# Patient Record
Sex: FEMALE | Race: OTHER | HISPANIC OR LATINO | Employment: OTHER | URBAN - METROPOLITAN AREA
[De-identification: names, ages, dates, MRNs, and addresses within clinical notes are randomized per-mention and may not be internally consistent; named-entity substitution may affect disease eponyms.]

---

## 2023-09-08 ENCOUNTER — TELEPHONE (OUTPATIENT)
Dept: PRIMARY CARE | Facility: CLINIC | Age: 73
End: 2023-09-08
Payer: MEDICARE

## 2023-09-08 NOTE — TELEPHONE ENCOUNTER
Potential patient called in and stated that you see her daughter Fela Park. The patient was last seen in our office on 08/07/2015. The patient would like to know can she reestablish with you?

## 2023-12-12 PROBLEM — Z00.00 MEDICARE ANNUAL WELLNESS VISIT, INITIAL: Status: ACTIVE | Noted: 2023-12-12

## 2024-01-06 ENCOUNTER — LAB (OUTPATIENT)
Dept: LAB | Facility: LAB | Age: 74
End: 2024-01-06
Payer: MEDICARE

## 2024-01-06 DIAGNOSIS — Z00.00 ANNUAL PHYSICAL EXAM: ICD-10-CM

## 2024-01-06 DIAGNOSIS — Z13.29 SCREENING FOR THYROID DISORDER: ICD-10-CM

## 2024-01-06 DIAGNOSIS — Z11.59 ENCOUNTER FOR HEPATITIS C SCREENING TEST FOR LOW RISK PATIENT: ICD-10-CM

## 2024-01-06 DIAGNOSIS — Z13.220 LIPID SCREENING: ICD-10-CM

## 2024-01-06 LAB
ALBUMIN SERPL BCP-MCNC: 4.2 G/DL (ref 3.4–5)
ALP SERPL-CCNC: 46 U/L (ref 33–136)
ALT SERPL W P-5'-P-CCNC: 20 U/L (ref 7–45)
ANION GAP SERPL CALC-SCNC: 14 MMOL/L (ref 10–20)
AST SERPL W P-5'-P-CCNC: 28 U/L (ref 9–39)
BILIRUB SERPL-MCNC: 0.6 MG/DL (ref 0–1.2)
BUN SERPL-MCNC: 17 MG/DL (ref 6–23)
CALCIUM SERPL-MCNC: 8.7 MG/DL (ref 8.6–10.3)
CHLORIDE SERPL-SCNC: 102 MMOL/L (ref 98–107)
CHOLEST SERPL-MCNC: 229 MG/DL (ref 0–199)
CHOLESTEROL/HDL RATIO: 5.6
CO2 SERPL-SCNC: 24 MMOL/L (ref 21–32)
CREAT SERPL-MCNC: 1.01 MG/DL (ref 0.5–1.05)
ERYTHROCYTE [DISTWIDTH] IN BLOOD BY AUTOMATED COUNT: 13 % (ref 11.5–14.5)
GFR SERPL CREATININE-BSD FRML MDRD: 59 ML/MIN/1.73M*2
GLUCOSE SERPL-MCNC: 110 MG/DL (ref 74–99)
HCT VFR BLD AUTO: 44.2 % (ref 36–46)
HCV AB SER QL: NONREACTIVE
HDLC SERPL-MCNC: 40.9 MG/DL
HGB BLD-MCNC: 14.6 G/DL (ref 12–16)
LDLC SERPL CALC-MCNC: 158 MG/DL
LDLC SERPL DIRECT ASSAY-MCNC: 154 MG/DL (ref 0–129)
MCH RBC QN AUTO: 30.9 PG (ref 26–34)
MCHC RBC AUTO-ENTMCNC: 33 G/DL (ref 32–36)
MCV RBC AUTO: 94 FL (ref 80–100)
NON HDL CHOLESTEROL: 188 MG/DL (ref 0–149)
NRBC BLD-RTO: 0 /100 WBCS (ref 0–0)
PLATELET # BLD AUTO: 270 X10*3/UL (ref 150–450)
POTASSIUM SERPL-SCNC: 4.1 MMOL/L (ref 3.5–5.3)
PROT SERPL-MCNC: 7.5 G/DL (ref 6.4–8.2)
RBC # BLD AUTO: 4.72 X10*6/UL (ref 4–5.2)
SODIUM SERPL-SCNC: 136 MMOL/L (ref 136–145)
TRIGL SERPL-MCNC: 153 MG/DL (ref 0–149)
TSH SERPL-ACNC: 2.18 MIU/L (ref 0.44–3.98)
VLDL: 31 MG/DL (ref 0–40)
WBC # BLD AUTO: 5.5 X10*3/UL (ref 4.4–11.3)

## 2024-01-06 PROCEDURE — 80053 COMPREHEN METABOLIC PANEL: CPT

## 2024-01-06 PROCEDURE — 84443 ASSAY THYROID STIM HORMONE: CPT

## 2024-01-06 PROCEDURE — 86803 HEPATITIS C AB TEST: CPT

## 2024-01-06 PROCEDURE — 36415 COLL VENOUS BLD VENIPUNCTURE: CPT

## 2024-01-06 PROCEDURE — 85027 COMPLETE CBC AUTOMATED: CPT

## 2024-01-06 PROCEDURE — 80061 LIPID PANEL: CPT

## 2024-01-06 PROCEDURE — 83721 ASSAY OF BLOOD LIPOPROTEIN: CPT

## 2024-01-07 PROBLEM — E78.00 BORDERLINE HYPERCHOLESTEROLEMIA: Status: ACTIVE | Noted: 2024-01-07

## 2024-01-07 PROBLEM — R73.09 ELEVATED GLUCOSE: Status: ACTIVE | Noted: 2024-01-07

## 2024-01-07 PROBLEM — R94.4 DECREASED GFR: Status: ACTIVE | Noted: 2024-01-07

## 2024-01-07 PROBLEM — E78.5 BORDERLINE HYPERLIPIDEMIA: Status: ACTIVE | Noted: 2024-01-07

## 2024-01-07 NOTE — ASSESSMENT & PLAN NOTE
Medical Wellness exam done.   Flu shot, Prevnar 20 given - Risks, benefits and side effects reviewed with patient.   DEXA, mammogram ordered  Cologuard ordered  Nonsmoker

## 2024-01-07 NOTE — PROGRESS NOTES
Subjective :  Chief Complaint: Maria Guadalupe Rincon is an 73 y.o. female here for an initial  Medicare Wellness visit. Has not been seen for check up in many years. Daughter and patient have noticed decline in memory - mostly short-term- last several years. Drives on a limited basis - has not gotten lost. Not losing words. Father did have Alzheimer's diagnosed at around the same age.    Patient otherwise feels well. No other complaints or concerns.    I have reviewed and reconciled the medication list with the patient today.    Current Outpatient Medications:     multivitamin tablet, Take 1 tablet by mouth once daily., Disp: , Rfl:     donepezil (Aricept) 5 mg tablet, Take 1 tablet (5 mg) by mouth once daily at bedtime., Disp: 30 tablet, Rfl: 5    The patient's relevant past medical, surgical, family and social history was reviewed in [facility].  All pertinent lab work and results for this visit were reviewed with patient.    Lab on 01/06/2024   Component Date Value Ref Range Status    Thyroid Stimulating Hormone 01/06/2024 2.18  0.44 - 3.98 mIU/L Final    WBC 01/06/2024 5.5  4.4 - 11.3 x10*3/uL Final    nRBC 01/06/2024 0.0  0.0 - 0.0 /100 WBCs Final    RBC 01/06/2024 4.72  4.00 - 5.20 x10*6/uL Final    Hemoglobin 01/06/2024 14.6  12.0 - 16.0 g/dL Final    Hematocrit 01/06/2024 44.2  36.0 - 46.0 % Final    MCV 01/06/2024 94  80 - 100 fL Final    MCH 01/06/2024 30.9  26.0 - 34.0 pg Final    MCHC 01/06/2024 33.0  32.0 - 36.0 g/dL Final    RDW 01/06/2024 13.0  11.5 - 14.5 % Final    Platelets 01/06/2024 270  150 - 450 x10*3/uL Final    Cholesterol 01/06/2024 229 (H)  0 - 199 mg/dL Final          Age      Desirable   Borderline High   High     0-19 Y     0 - 169       170 - 199     >/= 200    20-24 Y     0 - 189       190 - 224     >/= 225         >24 Y     0 - 199       200 - 239     >/= 240   **All ranges are based on fasting samples. Specific   therapeutic targets will vary based on patient-specific   cardiac  risk.    Pediatric guidelines reference:Pediatrics 2011, 128(S5).Adult guidelines reference: NCEP ATPIII Guidelines,DIDIER 2001, 258:4726-63    Venipuncture immediately after or during the administration of Metamizole may lead to falsely low results. Testing should be performed immediately prior to Metamizole dosing.    HDL-Cholesterol 01/06/2024 40.9  mg/dL Final      Age       Very Low   Low     Normal    High    0-19 Y    < 35      < 40     40-45     ----  20-24 Y    ----     < 40      >45      ----        >24 Y      ----     < 40     40-60      >60      Cholesterol/HDL Ratio 01/06/2024 5.6   Final      Ref Values  Desirable  < 3.4  High Risk  > 5.0    LDL Calculated 01/06/2024 158 (H)  <=99 mg/dL Final                                Near   Borderline      AGE      Desirable  Optimal    High     High     Very High     0-19 Y     0 - 109     ---    110-129   >/= 130     ----    20-24 Y     0 - 119     ---    120-159   >/= 160     ----      >24 Y     0 -  99   100-129  130-159   160-189     >/=190      VLDL 01/06/2024 31  0 - 40 mg/dL Final    Triglycerides 01/06/2024 153 (H)  0 - 149 mg/dL Final       Age         Desirable   Borderline High   High     Very High   0 D-90 D    19 - 174         ----         ----        ----  91 D- 9 Y     0 -  74        75 -  99     >/= 100      ----    10-19 Y     0 -  89        90 - 129     >/= 130      ----    20-24 Y     0 - 114       115 - 149     >/= 150      ----         >24 Y     0 - 149       150 - 199    200- 499    >/= 500    Venipuncture immediately after or during the administration of Metamizole may lead to falsely low results. Testing should be performed immediately prior to Metamizole dosing.    Non HDL Cholesterol 01/06/2024 188 (H)  0 - 149 mg/dL Final          Age       Desirable   Borderline High   High     Very High     0-19 Y     0 - 119       120 - 144     >/= 145    >/= 160    20-24 Y     0 - 149       150 - 189     >/= 190      ----         >24 Y    30 mg/dL  above LDL Cholesterol goal      LDL, Direct 01/06/2024 154 (H)  0 - 129 mg/dL Final    Glucose 01/06/2024 110 (H)  74 - 99 mg/dL Final    Sodium 01/06/2024 136  136 - 145 mmol/L Final    Potassium 01/06/2024 4.1  3.5 - 5.3 mmol/L Final    Chloride 01/06/2024 102  98 - 107 mmol/L Final    Bicarbonate 01/06/2024 24  21 - 32 mmol/L Final    Anion Gap 01/06/2024 14  10 - 20 mmol/L Final    Urea Nitrogen 01/06/2024 17  6 - 23 mg/dL Final    Creatinine 01/06/2024 1.01  0.50 - 1.05 mg/dL Final    eGFR 01/06/2024 59 (L)  >60 mL/min/1.73m*2 Final    Calculations of estimated GFR are performed using the 2021 CKD-EPI Study Refit equation without the race variable for the IDMS-Traceable creatinine methods.  https://jasn.asnjournals.org/content/early/2021/09/22/ASN.7210965558    Calcium 01/06/2024 8.7  8.6 - 10.3 mg/dL Final    Albumin 01/06/2024 4.2  3.4 - 5.0 g/dL Final    Alkaline Phosphatase 01/06/2024 46  33 - 136 U/L Final    Total Protein 01/06/2024 7.5  6.4 - 8.2 g/dL Final    AST 01/06/2024 28  9 - 39 U/L Final    Bilirubin, Total 01/06/2024 0.6  0.0 - 1.2 mg/dL Final    ALT 01/06/2024 20  7 - 45 U/L Final    Patients treated with Sulfasalazine may generate falsely decreased results for ALT.    Hepatitis C AB 01/06/2024 Nonreactive  Nonreactive Final    Results from patients taking biotin supplements or receiving high-dose biotin therapy should be interpreted with caution due to possible interference with this test. Providers may contact their local laboratory for further information.         Review of Systems   A complete review of systems was performed and all systems were normal except what is noted in the HPI.      List of current healthcare providers:  Patient Care Team:  Alysha Kelsey MD as PCP - General (Family Medicine)        Over the past 2 weeks, how often have you been bothered by any of the following problems?  Little interest or pleasure in doing things: Not at all  Feeling down, depressed, or  "hopeless: Several days  Patient Health Questionnaire-2 Score: 1             Advance Care Planning:    Living Will: Yes  POA: Yes    Objective :  /72   Pulse 86   Temp 36.7 °C (98.1 °F) (Temporal)   Ht 1.448 m (4' 9\")   Wt 51.4 kg (113 lb 6.4 oz)   SpO2 96%   BMI 24.54 kg/m²    No results found.  Physical Exam  Constitutional:       Appearance: Normal appearance.   HENT:      Head: Normocephalic and atraumatic.   Neck:      Vascular: No carotid bruit.   Cardiovascular:      Rate and Rhythm: Normal rate and regular rhythm.      Heart sounds: Normal heart sounds.   Pulmonary:      Effort: Pulmonary effort is normal.      Breath sounds: Normal breath sounds. No wheezing, rhonchi or rales.   Abdominal:      General: Abdomen is flat. Bowel sounds are normal.      Palpations: Abdomen is soft.      Tenderness: There is no abdominal tenderness. There is no guarding.   Musculoskeletal:         General: Normal range of motion.      Right lower leg: No edema.      Left lower leg: No edema.   Skin:     General: Skin is dry.   Neurological:      General: No focal deficit present.      Mental Status: She is alert and oriented to person, place, and time.   Psychiatric:         Mood and Affect: Mood normal.         Behavior: Behavior normal.         Thought Content: Thought content normal.         Assessment/Plan :  Problem List Items Addressed This Visit       Medicare annual wellness visit, initial - Primary     Medical Wellness exam done.   Flu shot, Prevnar 20 given - Risks, benefits and side effects reviewed with patient.   DEXA, mammogram ordered  Cologuard ordered  Nonsmoker         Decreased GFR     Recheck GFR and ACR in 90 days  Low salt diet         Relevant Orders    CBC (Completed)    Comprehensive Metabolic Panel (Completed)    Comprehensive Metabolic Panel    Albumin , Urine Random    Elevated glucose     Work on diet reviewed with patient.   Check A1c with next labs  recheck 4 months          Relevant " Orders    Hemoglobin A1C    Borderline hyperlipidemia     Work on diet reviewed with patient.   recheck 4 months          Relevant Orders    Cholesterol, LDL Direct    Lipid Panel    Memory loss     MMSE mildly decreased at 23  With family history and clinical presentation, suspect early Alzheimer's  Start aricept 5 mg at bedtime - Risks, benefits and side effects reviewed with patient.   B12 ordered  recheck 4 months          Relevant Medications    donepezil (Aricept) 5 mg tablet    Other Relevant Orders    Vitamin B12     Other Visit Diagnoses       Lipid screening        Relevant Orders    Lipid Panel (Completed)    Cholesterol, LDL Direct (Completed)    Encounter for hepatitis C screening test for low risk patient        Relevant Orders    Hepatitis C Antibody (Completed)    Screening for thyroid disorder        Relevant Orders    TSH with reflex to Free T4 if abnormal (Completed)    Asymptomatic menopause        Relevant Orders    XR DEXA bone density    Breast screening        Relevant Orders    BI mammo bilateral screening tomosynthesis    Encounter for screening for malignant neoplasm of colon        Relevant Orders    Cologuard® colon cancer screening               The following health maintenance schedule was reviewed with the patient and provided in printed form in the after visit summary:  Health Maintenance   Topic Date Due    Bone Density Scan  Never done    Colorectal Cancer Screening  Never done    COVID-19 Vaccine (1) Never done    DTaP/Tdap/Td Vaccines (1 - Tdap) Never done    Mammogram  Never done    Zoster Vaccines (1 of 2) Never done    Medicare Annual Wellness Visit (AWV)  01/09/2025    Lipid Panel  01/06/2029    Influenza Vaccine  Completed    Pneumococcal Vaccine: 65+ Years  Completed    Hepatitis C Screening  Completed    HIB Vaccines  Aged Out    Hepatitis B Vaccines  Aged Out    IPV Vaccines  Aged Out    Hepatitis A Vaccines  Aged Out    Meningococcal Vaccine  Aged Out    Rotavirus  Vaccines  Aged Out    HPV Vaccines  Aged Out           Patient and family understand and agree with treatment plan.          Alysha Kelsey MD

## 2024-01-07 NOTE — PATIENT INSTRUCTIONS
I recommend RSV vaccine, Shingrix, new COVID booster, and Boostrix at the pharmacy.    I would like you to follow up in 4 months  Please have all labs that were ordered done at least 1 week prior to your visit.

## 2024-01-08 ENCOUNTER — OFFICE VISIT (OUTPATIENT)
Dept: PRIMARY CARE | Facility: CLINIC | Age: 74
End: 2024-01-08
Payer: MEDICARE

## 2024-01-08 VITALS
TEMPERATURE: 98.1 F | OXYGEN SATURATION: 96 % | DIASTOLIC BLOOD PRESSURE: 72 MMHG | BODY MASS INDEX: 24.47 KG/M2 | HEIGHT: 57 IN | SYSTOLIC BLOOD PRESSURE: 130 MMHG | WEIGHT: 113.4 LBS | HEART RATE: 86 BPM

## 2024-01-08 DIAGNOSIS — E78.5 BORDERLINE HYPERLIPIDEMIA: ICD-10-CM

## 2024-01-08 DIAGNOSIS — R41.3 MEMORY LOSS: ICD-10-CM

## 2024-01-08 DIAGNOSIS — R19.5 POSITIVE COLORECTAL CANCER SCREENING USING COLOGUARD TEST: ICD-10-CM

## 2024-01-08 DIAGNOSIS — Z00.00 MEDICARE ANNUAL WELLNESS VISIT, INITIAL: Primary | ICD-10-CM

## 2024-01-08 DIAGNOSIS — R73.09 ELEVATED GLUCOSE: ICD-10-CM

## 2024-01-08 DIAGNOSIS — Z13.220 LIPID SCREENING: ICD-10-CM

## 2024-01-08 DIAGNOSIS — Z13.29 SCREENING FOR THYROID DISORDER: ICD-10-CM

## 2024-01-08 DIAGNOSIS — R94.4 DECREASED GFR: ICD-10-CM

## 2024-01-08 DIAGNOSIS — Z12.11 ENCOUNTER FOR SCREENING FOR MALIGNANT NEOPLASM OF COLON: ICD-10-CM

## 2024-01-08 DIAGNOSIS — Z78.0 ASYMPTOMATIC MENOPAUSE: ICD-10-CM

## 2024-01-08 DIAGNOSIS — Z11.59 ENCOUNTER FOR HEPATITIS C SCREENING TEST FOR LOW RISK PATIENT: ICD-10-CM

## 2024-01-08 DIAGNOSIS — Z12.39 BREAST SCREENING: ICD-10-CM

## 2024-01-08 PROCEDURE — 1170F FXNL STATUS ASSESSED: CPT | Performed by: FAMILY MEDICINE

## 2024-01-08 PROCEDURE — 1123F ACP DISCUSS/DSCN MKR DOCD: CPT | Performed by: FAMILY MEDICINE

## 2024-01-08 PROCEDURE — 90686 IIV4 VACC NO PRSV 0.5 ML IM: CPT | Performed by: FAMILY MEDICINE

## 2024-01-08 PROCEDURE — 1036F TOBACCO NON-USER: CPT | Performed by: FAMILY MEDICINE

## 2024-01-08 PROCEDURE — 1159F MED LIST DOCD IN RCRD: CPT | Performed by: FAMILY MEDICINE

## 2024-01-08 PROCEDURE — G0009 ADMIN PNEUMOCOCCAL VACCINE: HCPCS | Performed by: FAMILY MEDICINE

## 2024-01-08 PROCEDURE — 1160F RVW MEDS BY RX/DR IN RCRD: CPT | Performed by: FAMILY MEDICINE

## 2024-01-08 PROCEDURE — 90677 PCV20 VACCINE IM: CPT | Performed by: FAMILY MEDICINE

## 2024-01-08 PROCEDURE — 99203 OFFICE O/P NEW LOW 30 MIN: CPT | Performed by: FAMILY MEDICINE

## 2024-01-08 PROCEDURE — G0008 ADMIN INFLUENZA VIRUS VAC: HCPCS | Performed by: FAMILY MEDICINE

## 2024-01-08 PROCEDURE — G0438 PPPS, INITIAL VISIT: HCPCS | Performed by: FAMILY MEDICINE

## 2024-01-08 RX ORDER — BISMUTH SUBSALICYLATE 262 MG
1 TABLET,CHEWABLE ORAL DAILY
COMMUNITY

## 2024-01-08 RX ORDER — DONEPEZIL HYDROCHLORIDE 5 MG/1
5 TABLET, FILM COATED ORAL NIGHTLY
Qty: 30 TABLET | Refills: 5 | Status: SHIPPED | OUTPATIENT
Start: 2024-01-08 | End: 2024-07-06

## 2024-01-08 ASSESSMENT — PATIENT HEALTH QUESTIONNAIRE - PHQ9
SUM OF ALL RESPONSES TO PHQ9 QUESTIONS 1 AND 2: 1
2. FEELING DOWN, DEPRESSED OR HOPELESS: SEVERAL DAYS
1. LITTLE INTEREST OR PLEASURE IN DOING THINGS: NOT AT ALL
10. IF YOU CHECKED OFF ANY PROBLEMS, HOW DIFFICULT HAVE THESE PROBLEMS MADE IT FOR YOU TO DO YOUR WORK, TAKE CARE OF THINGS AT HOME, OR GET ALONG WITH OTHER PEOPLE: NOT DIFFICULT AT ALL

## 2024-01-08 ASSESSMENT — ACTIVITIES OF DAILY LIVING (ADL)
DOING_HOUSEWORK: INDEPENDENT
TAKING_MEDICATION: INDEPENDENT
GROCERY_SHOPPING: INDEPENDENT
BATHING: INDEPENDENT
DRESSING: INDEPENDENT
MANAGING_FINANCES: NEEDS ASSISTANCE

## 2024-01-08 ASSESSMENT — ENCOUNTER SYMPTOMS
DEPRESSION: 0
OCCASIONAL FEELINGS OF UNSTEADINESS: 0
LOSS OF SENSATION IN FEET: 0

## 2024-01-08 NOTE — ASSESSMENT & PLAN NOTE
MMSE mildly decreased at 23  With family history and clinical presentation, suspect early Alzheimer's  Start aricept 5 mg at bedtime - Risks, benefits and side effects reviewed with patient.   B12 ordered  recheck 4 months

## 2024-01-25 LAB — NONINV COLON CA DNA+OCC BLD SCRN STL QL: POSITIVE

## 2024-02-05 ENCOUNTER — OFFICE VISIT (OUTPATIENT)
Dept: SURGERY | Facility: CLINIC | Age: 74
End: 2024-02-05
Payer: MEDICARE

## 2024-02-05 VITALS
SYSTOLIC BLOOD PRESSURE: 172 MMHG | HEIGHT: 57 IN | HEART RATE: 80 BPM | BODY MASS INDEX: 25.56 KG/M2 | WEIGHT: 118.5 LBS | OXYGEN SATURATION: 98 % | DIASTOLIC BLOOD PRESSURE: 77 MMHG

## 2024-02-05 DIAGNOSIS — R19.5 POSITIVE COLORECTAL CANCER SCREENING USING COLOGUARD TEST: Primary | ICD-10-CM

## 2024-02-05 PROCEDURE — 1123F ACP DISCUSS/DSCN MKR DOCD: CPT | Performed by: SURGERY

## 2024-02-05 PROCEDURE — 1159F MED LIST DOCD IN RCRD: CPT | Performed by: SURGERY

## 2024-02-05 PROCEDURE — 1036F TOBACCO NON-USER: CPT | Performed by: SURGERY

## 2024-02-05 PROCEDURE — 99204 OFFICE O/P NEW MOD 45 MIN: CPT | Performed by: SURGERY

## 2024-02-05 PROCEDURE — 1160F RVW MEDS BY RX/DR IN RCRD: CPT | Performed by: SURGERY

## 2024-02-05 RX ORDER — SODIUM CHLORIDE 9 MG/ML
20 INJECTION, SOLUTION INTRAVENOUS CONTINUOUS
Status: CANCELLED | OUTPATIENT
Start: 2024-02-05

## 2024-02-05 RX ORDER — POLYETHYLENE GLYCOL 3350 17 G/17G
238 POWDER, FOR SOLUTION ORAL ONCE
Qty: 238 G | Refills: 0 | Status: SHIPPED | OUTPATIENT
Start: 2024-02-05 | End: 2024-02-05

## 2024-02-05 ASSESSMENT — ENCOUNTER SYMPTOMS
ABDOMINAL PAIN: 0
BLOOD IN STOOL: 0
SHORTNESS OF BREATH: 0
PALPITATIONS: 0
COUGH: 0
NAUSEA: 0
DIARRHEA: 0
FEVER: 0
DIZZINESS: 0
CHILLS: 0
HEADACHES: 0
VOMITING: 0
CONSTIPATION: 0

## 2024-02-05 NOTE — PROGRESS NOTES
"GENERAL SURGERY CLINIC NOTE    Maria Guadalupe Rincon   1950   [id number]     History Of Present Illness  Maria Guadalupe Rincon is a 73 y.o. female who presents to the office for evaluation of a positive ColoGuard. She has never undergone ColoGuard or colonoscopy previously. She has mild early dementia. The patient's daughter was present and is the PoA.      Past Medical History  She has no past medical history on file.    Surgical History  She has a past surgical history that includes Carpal tunnel release (Right); Lymph node biopsy (Left, ); Bunionectomy (Left, ); and  section, low transverse.    Medications  Current Outpatient Medications on File Prior to Visit   Medication Sig Dispense Refill    donepezil (Aricept) 5 mg tablet Take 1 tablet (5 mg) by mouth once daily at bedtime. 30 tablet 5    multivitamin tablet Take 1 tablet by mouth once daily.       No current facility-administered medications on file prior to visit.       Allergies  Patient has no known allergies.     Social History  She reports that she has never smoked. She has never used smokeless tobacco. She reports that she does not drink alcohol and does not use drugs.    Family History  Family History   Problem Relation Name Age of Onset    Heart disease Father      Hypertension Father      Alzheimer's disease Father     No fhx colorectal cancer     Review of Systems   Constitutional:  Negative for chills and fever.   Respiratory:  Negative for cough and shortness of breath.    Cardiovascular:  Negative for chest pain and palpitations.   Gastrointestinal:  Negative for abdominal pain, blood in stool, constipation, diarrhea, nausea and vomiting.   Neurological:  Negative for dizziness and headaches.   All other systems reviewed and are negative.      Last Recorded Vitals  Blood pressure 172/77, pulse 80, height 1.448 m (4' 9\"), weight 53.8 kg (118 lb 8 oz), SpO2 98 %.     Physical Exam  Constitutional:       General: She is not in acute " distress.     Appearance: Normal appearance. She is not ill-appearing.   HENT:      Head: Normocephalic and atraumatic.   Cardiovascular:      Rate and Rhythm: Normal rate and regular rhythm.   Pulmonary:      Effort: Pulmonary effort is normal. No respiratory distress.      Breath sounds: Normal breath sounds.   Abdominal:      General: There is no distension.      Palpations: Abdomen is soft.      Tenderness: There is no abdominal tenderness. There is no guarding.   Musculoskeletal:         General: No swelling.   Skin:     General: Skin is warm and dry.   Neurological:      Mental Status: She is alert and oriented to person, place, and time. Mental status is at baseline.   Psychiatric:         Mood and Affect: Mood normal.         Behavior: Behavior normal.          Relevant Results  ColoGuard results reviewed    Assessment and Plan  73 y.o. female with a positive ColoGuard. I discussed that there is an increased risk of having advanced polyps or colon cancer, but this could also be a false positive. The patient initially appeared fairly reluctant to proceed with colonoscopy, mainly due to being nervous about the procedure, but ultimately would like to know the results. The risks and benefits of undergoing colonoscopy were discussed. Risks include COVID-19 transmission/infection, allergic reactions, heart or lung complications, bleeding, infection, injury to surrounding tissue, and perforation of the colon. The patient expressed understanding and her daughter provided informed consent for the procedure.     Colonoscopy in Endoscopy 3/6/24. The patient's daughter will probably have to provide consent for Anesthesia as well. The Miralax prep was sent to the patient's pharmacy. I changed the primary phone number from the patient's number to the daughter's number.     Siomara Rios MD, FACS  General Surgery

## 2024-02-08 ENCOUNTER — HOSPITAL ENCOUNTER (OUTPATIENT)
Dept: RADIOLOGY | Facility: CLINIC | Age: 74
Discharge: HOME | End: 2024-02-08
Payer: MEDICARE

## 2024-02-08 ENCOUNTER — TELEPHONE (OUTPATIENT)
Dept: PRIMARY CARE | Facility: CLINIC | Age: 74
End: 2024-02-08

## 2024-02-08 DIAGNOSIS — Z12.31 ENCOUNTER FOR SCREENING MAMMOGRAM FOR BREAST CANCER: Primary | ICD-10-CM

## 2024-02-08 DIAGNOSIS — Z78.0 ASYMPTOMATIC MENOPAUSE: ICD-10-CM

## 2024-02-08 PROCEDURE — 77080 DXA BONE DENSITY AXIAL: CPT

## 2024-02-08 NOTE — TELEPHONE ENCOUNTER
Radiology called regarding the mammogram order. They said that patient's insurance did not cover the Z12.39 code but will cover the Z12.31 code. They are requesting this be updated so patient is able to get her mammogram. Please advise.

## 2024-02-15 NOTE — TELEPHONE ENCOUNTER
The first order has been canceled and is no longer on the schedule.       I will call the patient when the new order is written so she can reschedule      New code - Z12.31

## 2024-02-29 ENCOUNTER — HOSPITAL ENCOUNTER (OUTPATIENT)
Dept: RADIOLOGY | Facility: CLINIC | Age: 74
Discharge: HOME | End: 2024-02-29
Payer: MEDICARE

## 2024-02-29 VITALS — BODY MASS INDEX: 22.67 KG/M2 | HEIGHT: 59 IN | WEIGHT: 112.43 LBS

## 2024-02-29 DIAGNOSIS — Z12.31 ENCOUNTER FOR SCREENING MAMMOGRAM FOR BREAST CANCER: ICD-10-CM

## 2024-02-29 PROCEDURE — 77067 SCR MAMMO BI INCL CAD: CPT

## 2024-02-29 PROCEDURE — 77067 SCR MAMMO BI INCL CAD: CPT | Performed by: RADIOLOGY

## 2024-02-29 PROCEDURE — 77063 BREAST TOMOSYNTHESIS BI: CPT | Performed by: RADIOLOGY

## 2024-03-06 ENCOUNTER — ANESTHESIA EVENT (OUTPATIENT)
Dept: GASTROENTEROLOGY | Facility: HOSPITAL | Age: 74
End: 2024-03-06
Payer: MEDICARE

## 2024-03-06 ENCOUNTER — ANESTHESIA (OUTPATIENT)
Dept: GASTROENTEROLOGY | Facility: HOSPITAL | Age: 74
End: 2024-03-06
Payer: MEDICARE

## 2024-03-06 ENCOUNTER — HOSPITAL ENCOUNTER (OUTPATIENT)
Dept: GASTROENTEROLOGY | Facility: HOSPITAL | Age: 74
Discharge: HOME | End: 2024-03-06
Payer: MEDICARE

## 2024-03-06 VITALS
TEMPERATURE: 96.8 F | RESPIRATION RATE: 16 BRPM | OXYGEN SATURATION: 99 % | HEART RATE: 80 BPM | DIASTOLIC BLOOD PRESSURE: 78 MMHG | SYSTOLIC BLOOD PRESSURE: 146 MMHG

## 2024-03-06 DIAGNOSIS — R19.5 POSITIVE COLORECTAL CANCER SCREENING USING COLOGUARD TEST: Primary | ICD-10-CM

## 2024-03-06 PROCEDURE — G0121 COLON CA SCRN NOT HI RSK IND: HCPCS | Performed by: SURGERY

## 2024-03-06 PROCEDURE — 2500000004 HC RX 250 GENERAL PHARMACY W/ HCPCS (ALT 636 FOR OP/ED): Performed by: LICENSED PRACTICAL NURSE

## 2024-03-06 PROCEDURE — 2500000004 HC RX 250 GENERAL PHARMACY W/ HCPCS (ALT 636 FOR OP/ED): Performed by: SURGERY

## 2024-03-06 PROCEDURE — 45378 DIAGNOSTIC COLONOSCOPY: CPT | Performed by: SURGERY

## 2024-03-06 PROCEDURE — 7100000009 HC PHASE TWO TIME - INITIAL BASE CHARGE

## 2024-03-06 PROCEDURE — 2500000005 HC RX 250 GENERAL PHARMACY W/O HCPCS: Performed by: LICENSED PRACTICAL NURSE

## 2024-03-06 PROCEDURE — G0105 COLORECTAL SCRN; HI RISK IND: HCPCS | Performed by: SURGERY

## 2024-03-06 PROCEDURE — 3700000001 HC GENERAL ANESTHESIA TIME - INITIAL BASE CHARGE

## 2024-03-06 PROCEDURE — 3700000002 HC GENERAL ANESTHESIA TIME - EACH INCREMENTAL 1 MINUTE

## 2024-03-06 PROCEDURE — 7100000010 HC PHASE TWO TIME - EACH INCREMENTAL 1 MINUTE

## 2024-03-06 RX ORDER — PROPOFOL 10 MG/ML
INJECTION, EMULSION INTRAVENOUS AS NEEDED
Status: DISCONTINUED | OUTPATIENT
Start: 2024-03-06 | End: 2024-03-06

## 2024-03-06 RX ORDER — SODIUM CHLORIDE 9 MG/ML
20 INJECTION, SOLUTION INTRAVENOUS CONTINUOUS
Status: DISCONTINUED | OUTPATIENT
Start: 2024-03-06 | End: 2024-03-07 | Stop reason: HOSPADM

## 2024-03-06 RX ORDER — LIDOCAINE HYDROCHLORIDE 20 MG/ML
INJECTION, SOLUTION EPIDURAL; INFILTRATION; INTRACAUDAL; PERINEURAL AS NEEDED
Status: DISCONTINUED | OUTPATIENT
Start: 2024-03-06 | End: 2024-03-06

## 2024-03-06 RX ADMIN — PROPOFOL 20 MG: 10 INJECTION, EMULSION INTRAVENOUS at 11:05

## 2024-03-06 RX ADMIN — PROPOFOL 30 MG: 10 INJECTION, EMULSION INTRAVENOUS at 10:55

## 2024-03-06 RX ADMIN — PROPOFOL 30 MG: 10 INJECTION, EMULSION INTRAVENOUS at 11:01

## 2024-03-06 RX ADMIN — SODIUM CHLORIDE 20 ML/HR: 9 INJECTION, SOLUTION INTRAVENOUS at 10:30

## 2024-03-06 RX ADMIN — PROPOFOL 30 MG: 10 INJECTION, EMULSION INTRAVENOUS at 10:57

## 2024-03-06 RX ADMIN — PROPOFOL 30 MG: 10 INJECTION, EMULSION INTRAVENOUS at 10:59

## 2024-03-06 RX ADMIN — PROPOFOL 30 MG: 10 INJECTION, EMULSION INTRAVENOUS at 11:03

## 2024-03-06 RX ADMIN — PROPOFOL 30 MG: 10 INJECTION, EMULSION INTRAVENOUS at 10:53

## 2024-03-06 RX ADMIN — LIDOCAINE HYDROCHLORIDE 20 MG: 20 INJECTION, SOLUTION EPIDURAL; INFILTRATION; INTRACAUDAL; PERINEURAL at 10:51

## 2024-03-06 RX ADMIN — PROPOFOL 50 MG: 10 INJECTION, EMULSION INTRAVENOUS at 10:51

## 2024-03-06 SDOH — HEALTH STABILITY: MENTAL HEALTH: CURRENT SMOKER: 0

## 2024-03-06 ASSESSMENT — ENCOUNTER SYMPTOMS
COUGH: 0
HEADACHES: 0
DIARRHEA: 0
CONSTIPATION: 0
PALPITATIONS: 0
BLOOD IN STOOL: 0
ABDOMINAL PAIN: 0
DIZZINESS: 0
FEVER: 0
VOMITING: 0
SHORTNESS OF BREATH: 0
CHILLS: 0
NAUSEA: 0

## 2024-03-06 ASSESSMENT — COLUMBIA-SUICIDE SEVERITY RATING SCALE - C-SSRS
2. HAVE YOU ACTUALLY HAD ANY THOUGHTS OF KILLING YOURSELF?: NO
6. HAVE YOU EVER DONE ANYTHING, STARTED TO DO ANYTHING, OR PREPARED TO DO ANYTHING TO END YOUR LIFE?: NO
1. IN THE PAST MONTH, HAVE YOU WISHED YOU WERE DEAD OR WISHED YOU COULD GO TO SLEEP AND NOT WAKE UP?: NO

## 2024-03-06 NOTE — H&P
GENERAL SURGERY HISTORY AND PHYSICAL    Maria Guadalupe Rincon   1950   [id number]     History Of Present Illness  Maria Guadalupe Rincon is a 73 y.o. female presenting for colonoscopy due to a positive Cologuard.     Past Medical History  She has a past medical history of Colon cancer (CMS/[facility]).    Surgical History  She has a past surgical history that includes Carpal tunnel release (Right); Lymph node biopsy (Left, ); Bunionectomy (Left, ); and  section, low transverse.    Medications  Current Outpatient Medications on File Prior to Encounter   Medication Sig Dispense Refill    donepezil (Aricept) 5 mg tablet Take 1 tablet (5 mg) by mouth once daily at bedtime. 30 tablet 5    multivitamin tablet Take 1 tablet by mouth once daily.       No current facility-administered medications on file prior to encounter.       Allergies  Patient has no known allergies.     Social History  She reports that she has never smoked. She has never used smokeless tobacco. She reports that she does not drink alcohol and does not use drugs.    Family History  Family History   Problem Relation Name Age of Onset    Heart disease Father      Hypertension Father      Alzheimer's disease Father          Review of Systems   Constitutional:  Negative for chills and fever.   Respiratory:  Negative for cough and shortness of breath.    Cardiovascular:  Negative for chest pain and palpitations.   Gastrointestinal:  Negative for abdominal pain, blood in stool, constipation, diarrhea, nausea and vomiting.   Neurological:  Negative for dizziness and headaches.   All other systems reviewed and are negative.      Last Recorded Vitals  There were no vitals taken for this visit.     Physical Exam  Constitutional:       General: She is not in acute distress.     Appearance: Normal appearance. She is not ill-appearing.   HENT:      Head: Normocephalic and atraumatic.   Cardiovascular:      Rate and Rhythm: Normal rate and regular rhythm.    Pulmonary:      Effort: Pulmonary effort is normal. No respiratory distress.      Breath sounds: Normal breath sounds.   Abdominal:      General: There is no distension.      Palpations: Abdomen is soft.      Tenderness: There is no abdominal tenderness. There is no guarding.   Musculoskeletal:         General: No swelling.   Skin:     General: Skin is warm and dry.   Neurological:      Mental Status: She is alert and oriented to person, place, and time. Mental status is at baseline.   Psychiatric:         Mood and Affect: Mood normal.         Behavior: Behavior normal.            Assessment and Plan  Active Problems:  There are no active Hospital Problems.    73 y.o. female who presents for colonoscopy today.    Siomara Rios MD, [facility]  General Surgery

## 2024-03-06 NOTE — ANESTHESIA PREPROCEDURE EVALUATION
Patient: Maria Guadalupe Rincon    Procedure Information       Date/Time: 03/06/24 1030    Scheduled providers: Siomara Rios MD    Procedure: COLONOSCOPY    Location: [facility]            Relevant Problems   Anesthesia (within normal limits)      Cardiovascular   (+) Borderline hyperlipidemia      Endocrine (within normal limits)      GI (within normal limits)      /Renal (within normal limits)      Neuro/Psych (within normal limits)      Pulmonary (within normal limits)      GI/Hepatic (within normal limits)      Hematology (within normal limits)      Musculoskeletal (within normal limits)      Eyes, Ears, Nose, and Throat (within normal limits)      Infectious Disease (within normal limits)       Clinical information reviewed:   Tobacco  Allergies  Meds   Med Hx  Surg Hx   Fam Hx  Soc Hx        NPO Detail:  NPO/Void Status  Date of Last Liquid: 03/05/24  Time of Last Liquid: 2000  Date of Last Solid: 03/05/24  Time of Last Solid: 1900  Last Intake Type: Clear fluids         Physical Exam    Airway  Mallampati: III  TM distance: >3 FB  Neck ROM: full     Cardiovascular    Dental    Pulmonary    Abdominal            Anesthesia Plan    History of general anesthesia?: yes  History of complications of general anesthesia?: no    ASA 2     MAC     The patient is not a current smoker.    intravenous induction   Anesthetic plan and risks discussed with patient.

## 2024-03-06 NOTE — ANESTHESIA POSTPROCEDURE EVALUATION
Patient: Maria Gudaalupe Rincon    Procedure Summary       Date: 03/06/24 Room / Location: [facility]    Anesthesia Start: 1048 Anesthesia Stop: 1112    Procedure: COLONOSCOPY Diagnosis:       Positive colorectal cancer screening using Cologuard test      Positive colorectal cancer screening using Cologuard test    Scheduled Providers: Siomara Rios MD Responsible Provider: JOSE Saba    Anesthesia Type: MAC ASA Status: 2            Anesthesia Type: MAC    Vitals Value Taken Time   /87 03/06/24 1132   Temp 36 °C (96.8 °F) 03/06/24 1132   Pulse 81 03/06/24 1132   Resp 18 03/06/24 1132   SpO2 99 % 03/06/24 1132       Anesthesia Post Evaluation    Patient location during evaluation: PACU  Patient participation: complete - patient participated  Level of consciousness: awake and alert  Pain management: adequate  Airway patency: patent  Cardiovascular status: acceptable  Respiratory status: acceptable  Hydration status: acceptable  Postoperative Nausea and Vomiting: none        There were no known notable events for this encounter.

## 2024-03-07 NOTE — ADDENDUM NOTE
Encounter addended by: Marty Gallardo RN on: 3/7/2024 10:59 AM   Actions taken: Contacts section saved, Flowsheet accepted

## 2024-05-06 ENCOUNTER — APPOINTMENT (OUTPATIENT)
Dept: PRIMARY CARE | Facility: CLINIC | Age: 74
End: 2024-05-06
Payer: MEDICARE

## 2024-05-08 ENCOUNTER — APPOINTMENT (OUTPATIENT)
Dept: PRIMARY CARE | Facility: CLINIC | Age: 74
End: 2024-05-08
Payer: MEDICARE

## 2025-08-18 DIAGNOSIS — R73.09 ELEVATED GLUCOSE: ICD-10-CM

## 2025-08-18 DIAGNOSIS — E78.5 BORDERLINE HYPERLIPIDEMIA: ICD-10-CM

## 2025-08-18 DIAGNOSIS — R94.4 DECREASED GFR: Primary | ICD-10-CM

## 2025-11-21 ENCOUNTER — APPOINTMENT (OUTPATIENT)
Dept: PRIMARY CARE | Facility: CLINIC | Age: 75
End: 2025-11-21
Payer: MEDICARE